# Patient Record
Sex: MALE | Race: WHITE
[De-identification: names, ages, dates, MRNs, and addresses within clinical notes are randomized per-mention and may not be internally consistent; named-entity substitution may affect disease eponyms.]

---

## 2020-07-29 ENCOUNTER — HOSPITAL ENCOUNTER (INPATIENT)
Dept: HOSPITAL 35 - ER | Age: 55
LOS: 2 days | Discharge: HOME | DRG: 62 | End: 2020-07-31
Attending: HOSPITALIST | Admitting: HOSPITALIST
Payer: COMMERCIAL

## 2020-07-29 VITALS — WEIGHT: 174.8 LBS | BODY MASS INDEX: 23.68 KG/M2 | HEIGHT: 72.01 IN

## 2020-07-29 VITALS — SYSTOLIC BLOOD PRESSURE: 160 MMHG | DIASTOLIC BLOOD PRESSURE: 89 MMHG

## 2020-07-29 DIAGNOSIS — Z79.899: ICD-10-CM

## 2020-07-29 DIAGNOSIS — R47.01: ICD-10-CM

## 2020-07-29 DIAGNOSIS — G83.21: ICD-10-CM

## 2020-07-29 DIAGNOSIS — I63.9: Primary | ICD-10-CM

## 2020-07-29 DIAGNOSIS — Z79.82: ICD-10-CM

## 2020-07-29 DIAGNOSIS — Q21.1: ICD-10-CM

## 2020-07-29 LAB
ALBUMIN SERPL-MCNC: 4.6 G/DL (ref 3.4–5)
ALT SERPL-CCNC: 35 U/L (ref 30–65)
ANION GAP SERPL CALC-SCNC: 12 MMOL/L (ref 7–16)
APTT BLD: 27.3 SECONDS (ref 24.5–32.8)
AST SERPL-CCNC: 24 U/L (ref 15–37)
BILIRUB DIRECT SERPL-MCNC: 0.1 MG/DL
BILIRUB SERPL-MCNC: 0.7 MG/DL (ref 0.2–1)
BILIRUB UR-MCNC: NEGATIVE MG/DL
BUN SERPL-MCNC: 16 MG/DL (ref 7–18)
CALCIUM SERPL-MCNC: 9.7 MG/DL (ref 8.5–10.1)
CHLORIDE SERPL-SCNC: 99 MMOL/L (ref 98–107)
CO2 SERPL-SCNC: 27 MMOL/L (ref 21–32)
COLOR UR: YELLOW
CREAT SERPL-MCNC: 0.9 MG/DL (ref 0.7–1.3)
ERYTHROCYTE [DISTWIDTH] IN BLOOD BY AUTOMATED COUNT: 13.2 % (ref 10.5–14.5)
GLUCOSE SERPL-MCNC: 115 MG/DL (ref 74–106)
HCT VFR BLD CALC: 47.4 % (ref 42–52)
HGB BLD-MCNC: 17.1 GM/DL (ref 14–18)
INR PPP: 1
KETONES UR STRIP-MCNC: NEGATIVE MG/DL
MCH RBC QN AUTO: 32 PG (ref 26–34)
MCHC RBC AUTO-ENTMCNC: 36.1 G/DL (ref 28–37)
MCV RBC: 88.5 FL (ref 80–100)
PLATELET # BLD: 284 THOU/UL (ref 150–400)
POTASSIUM SERPL-SCNC: 3.6 MMOL/L (ref 3.5–5.1)
PROT SERPL-MCNC: 7.8 G/DL (ref 6.4–8.2)
PROTHROMBIN TIME: 10.3 SECONDS (ref 9.3–11.4)
RBC # BLD AUTO: 5.35 MIL/UL (ref 4.5–6)
RBC # UR STRIP: NEGATIVE /UL
SODIUM SERPL-SCNC: 138 MMOL/L (ref 136–145)
SP GR UR STRIP: >= 1.03 (ref 1–1.03)
TROPONIN I SERPL-MCNC: <0.06 NG/ML (ref ?–0.06)
URINE CLARITY: CLEAR
URINE GLUCOSE-RANDOM*: NEGATIVE
URINE LEUKOCYTES-REFLEX: NEGATIVE
URINE NITRITE-REFLEX: NEGATIVE
URINE PROTEIN (DIPSTICK): NEGATIVE
UROBILINOGEN UR STRIP-ACNC: 0.2 E.U./DL (ref 0.2–1)
WBC # BLD AUTO: 8.3 THOU/UL (ref 4–11)

## 2020-07-29 PROCEDURE — 10078: CPT

## 2020-07-30 VITALS — DIASTOLIC BLOOD PRESSURE: 88 MMHG | SYSTOLIC BLOOD PRESSURE: 134 MMHG

## 2020-07-30 VITALS — SYSTOLIC BLOOD PRESSURE: 128 MMHG | DIASTOLIC BLOOD PRESSURE: 76 MMHG

## 2020-07-30 VITALS — DIASTOLIC BLOOD PRESSURE: 79 MMHG | SYSTOLIC BLOOD PRESSURE: 123 MMHG

## 2020-07-30 VITALS — SYSTOLIC BLOOD PRESSURE: 130 MMHG | DIASTOLIC BLOOD PRESSURE: 82 MMHG

## 2020-07-30 VITALS — SYSTOLIC BLOOD PRESSURE: 133 MMHG | DIASTOLIC BLOOD PRESSURE: 87 MMHG

## 2020-07-30 VITALS — SYSTOLIC BLOOD PRESSURE: 145 MMHG | DIASTOLIC BLOOD PRESSURE: 96 MMHG

## 2020-07-30 VITALS — SYSTOLIC BLOOD PRESSURE: 118 MMHG | DIASTOLIC BLOOD PRESSURE: 82 MMHG

## 2020-07-30 VITALS — SYSTOLIC BLOOD PRESSURE: 141 MMHG | DIASTOLIC BLOOD PRESSURE: 88 MMHG

## 2020-07-30 VITALS — DIASTOLIC BLOOD PRESSURE: 74 MMHG | SYSTOLIC BLOOD PRESSURE: 118 MMHG

## 2020-07-30 VITALS — SYSTOLIC BLOOD PRESSURE: 132 MMHG | DIASTOLIC BLOOD PRESSURE: 80 MMHG

## 2020-07-30 VITALS — DIASTOLIC BLOOD PRESSURE: 80 MMHG | SYSTOLIC BLOOD PRESSURE: 139 MMHG

## 2020-07-30 VITALS — DIASTOLIC BLOOD PRESSURE: 89 MMHG | SYSTOLIC BLOOD PRESSURE: 131 MMHG

## 2020-07-30 VITALS — SYSTOLIC BLOOD PRESSURE: 131 MMHG | DIASTOLIC BLOOD PRESSURE: 84 MMHG

## 2020-07-30 VITALS — SYSTOLIC BLOOD PRESSURE: 115 MMHG | DIASTOLIC BLOOD PRESSURE: 70 MMHG

## 2020-07-30 VITALS — DIASTOLIC BLOOD PRESSURE: 75 MMHG | SYSTOLIC BLOOD PRESSURE: 122 MMHG

## 2020-07-30 VITALS — SYSTOLIC BLOOD PRESSURE: 125 MMHG | DIASTOLIC BLOOD PRESSURE: 81 MMHG

## 2020-07-30 VITALS — SYSTOLIC BLOOD PRESSURE: 128 MMHG | DIASTOLIC BLOOD PRESSURE: 79 MMHG

## 2020-07-30 VITALS — DIASTOLIC BLOOD PRESSURE: 87 MMHG | SYSTOLIC BLOOD PRESSURE: 141 MMHG

## 2020-07-30 VITALS — SYSTOLIC BLOOD PRESSURE: 124 MMHG | DIASTOLIC BLOOD PRESSURE: 82 MMHG

## 2020-07-30 VITALS — DIASTOLIC BLOOD PRESSURE: 80 MMHG | SYSTOLIC BLOOD PRESSURE: 115 MMHG

## 2020-07-30 VITALS — DIASTOLIC BLOOD PRESSURE: 87 MMHG | SYSTOLIC BLOOD PRESSURE: 128 MMHG

## 2020-07-30 VITALS — SYSTOLIC BLOOD PRESSURE: 144 MMHG | DIASTOLIC BLOOD PRESSURE: 86 MMHG

## 2020-07-30 VITALS — SYSTOLIC BLOOD PRESSURE: 130 MMHG | DIASTOLIC BLOOD PRESSURE: 75 MMHG

## 2020-07-30 VITALS — SYSTOLIC BLOOD PRESSURE: 133 MMHG | DIASTOLIC BLOOD PRESSURE: 92 MMHG

## 2020-07-30 VITALS — SYSTOLIC BLOOD PRESSURE: 127 MMHG | DIASTOLIC BLOOD PRESSURE: 75 MMHG

## 2020-07-30 VITALS — DIASTOLIC BLOOD PRESSURE: 79 MMHG | SYSTOLIC BLOOD PRESSURE: 117 MMHG

## 2020-07-30 VITALS — SYSTOLIC BLOOD PRESSURE: 121 MMHG | DIASTOLIC BLOOD PRESSURE: 72 MMHG

## 2020-07-30 VITALS — DIASTOLIC BLOOD PRESSURE: 77 MMHG | SYSTOLIC BLOOD PRESSURE: 119 MMHG

## 2020-07-30 VITALS — SYSTOLIC BLOOD PRESSURE: 123 MMHG | DIASTOLIC BLOOD PRESSURE: 77 MMHG

## 2020-07-30 VITALS — DIASTOLIC BLOOD PRESSURE: 71 MMHG | SYSTOLIC BLOOD PRESSURE: 119 MMHG

## 2020-07-30 VITALS — DIASTOLIC BLOOD PRESSURE: 87 MMHG | SYSTOLIC BLOOD PRESSURE: 125 MMHG

## 2020-07-30 VITALS — SYSTOLIC BLOOD PRESSURE: 137 MMHG | DIASTOLIC BLOOD PRESSURE: 85 MMHG

## 2020-07-30 VITALS — DIASTOLIC BLOOD PRESSURE: 82 MMHG | SYSTOLIC BLOOD PRESSURE: 128 MMHG

## 2020-07-30 VITALS — SYSTOLIC BLOOD PRESSURE: 134 MMHG | DIASTOLIC BLOOD PRESSURE: 89 MMHG

## 2020-07-30 VITALS — DIASTOLIC BLOOD PRESSURE: 85 MMHG | SYSTOLIC BLOOD PRESSURE: 131 MMHG

## 2020-07-30 VITALS — SYSTOLIC BLOOD PRESSURE: 129 MMHG | DIASTOLIC BLOOD PRESSURE: 84 MMHG

## 2020-07-30 VITALS — DIASTOLIC BLOOD PRESSURE: 76 MMHG | SYSTOLIC BLOOD PRESSURE: 125 MMHG

## 2020-07-30 VITALS — SYSTOLIC BLOOD PRESSURE: 107 MMHG | DIASTOLIC BLOOD PRESSURE: 63 MMHG

## 2020-07-30 VITALS — DIASTOLIC BLOOD PRESSURE: 72 MMHG | SYSTOLIC BLOOD PRESSURE: 115 MMHG

## 2020-07-30 VITALS — DIASTOLIC BLOOD PRESSURE: 69 MMHG | SYSTOLIC BLOOD PRESSURE: 115 MMHG

## 2020-07-30 VITALS — DIASTOLIC BLOOD PRESSURE: 74 MMHG | SYSTOLIC BLOOD PRESSURE: 129 MMHG

## 2020-07-30 LAB
ALBUMIN SERPL-MCNC: 3.5 G/DL (ref 3.4–5)
ALT SERPL-CCNC: 26 U/L (ref 30–65)
ANION GAP SERPL CALC-SCNC: 11 MMOL/L (ref 7–16)
AST SERPL-CCNC: 21 U/L (ref 15–37)
BILIRUB SERPL-MCNC: 0.7 MG/DL (ref 0.2–1)
BUN SERPL-MCNC: 12 MG/DL (ref 7–18)
CALCIUM SERPL-MCNC: 8.1 MG/DL (ref 8.5–10.1)
CHLORIDE SERPL-SCNC: 104 MMOL/L (ref 98–107)
CHOLEST SERPL-MCNC: 209 MG/DL (ref ?–200)
CO2 SERPL-SCNC: 23 MMOL/L (ref 21–32)
CREAT SERPL-MCNC: 0.7 MG/DL (ref 0.7–1.3)
GLUCOSE SERPL-MCNC: 88 MG/DL (ref 74–106)
HDLC SERPL-MCNC: 44 MG/DL (ref 40–?)
LDLC SERPL-MCNC: 119 MG/DL (ref ?–100)
POTASSIUM SERPL-SCNC: 3.6 MMOL/L (ref 3.5–5.1)
PROT SERPL-MCNC: 6.5 G/DL (ref 6.4–8.2)
SODIUM SERPL-SCNC: 138 MMOL/L (ref 136–145)
TC:HDL: 4.8 RATIO
TRIGL SERPL-MCNC: 231 MG/DL (ref ?–150)
VLDLC SERPL CALC-MCNC: 46 MG/DL (ref ?–40)

## 2020-07-30 NOTE — 2DMMODE
[facility]
[address]
[place]   [address]                   2 D/M-MODE ECHOCARDIOGRAM     
_______________________________________________________________________________
 
Name:       MARY VALLEJO                   Room #:         243-P       ADM IN  
M.R.#:      [id number]                       Account #:      [id number]  
Admission:  20    Attend Phys:    Greg Wilkinson MD     
Discharge:              Date of Birth:  [birth year]  
                                                          Report #: [id number]
                                                                    [id number]
_______________________________________________________________________________
THIS REPORT FOR:  
 
cc:  FAM - Family physician unknown
     FAM - Family physician unknown
     Mao Lu MD                                        
                                                                       ~
 
--------------- APPROVED REPORT --------------
 
 
Study performed:  2020 11:43:43
 
EXAM: Comprehensive 2D, Doppler, and color-flow 
Echocardiogram 
Patient Location: ICU    
Room #:  243     Status:  routine
 
      BSA:         2.01
HR: 62 bpm BP:          115/70 mmHg 
Rhythm: NSR     
 
Other Information 
Study Quality: Adequate
 
Indications
CVA/TIA 
 
Echo Enhancing Agent
Indication: Rule out Shunt
Agent(s) / Amount(s) Used: Agitated Saline 7 cc
 
2D Dimensions
RVDd:  37.07 mm  
IVSd:  8.71 (7-11mm) LVOT Diam:  21.98 (18-24mm) 
LVDd:  45.55 mm  
PWd:  9.04 (7-11mm) Ascending Ao:  32.10 (22-36mm)
LVDs:  30.68 (25-40mm) 
Aortic Root:  32.29 mm IVC:  14.00 mm
 
Volumes
Left Atrial Volume (Systole) 
Single Plane 4CH:  33.62 mL Single Plane 2CH:  24.99 mL
    LA ESV Index:  18.00 mL/m2
 
Aortic Valve
AoV Peak Pako.:  1.08 m/s 
 
 
[facility]
[address]
[place]  [address]
Phone:  [phone]                    2 D/M-MODE ECHOCARDIOGRAM     
_______________________________________________________________________________
 
Name:            MARY VALLEJO                   Room #:        243-P       [facility] [place]
.R.#:           [id number]          Account #:     [id number]  
Admission:       20         Attend Phys:   Greg Wilkinson MD 
Discharge:                  Date of Birth: [birth year]  
                         Report #:      [id number]
        [id number]
_______________________________________________________________________________
AO Peak Gr.:  4.67 mmHg  LVOT Max P.80 mmHg
    LVOT Max V:  0.84 m/s
JOSE Vmax: 2.93 cm2  
 
Mitral Valve
    E/A Ratio:  1.1
    MV Decel. Time:  157.07 ms
MV E Max Pako.:  0.76 m/s 
MV A Pako.:  0.71 m/s  
MV PHT:  45.55 ms  
IVRT:  101.50 ms  
 
Pulmonary Valve
PV Peak Pako.:  0.95 m/s PV Peak Gr.:  3.78 mmHg
 
Pulmonary Vein
P Vein S:    0.44 m/s P Vein A:  0.21 m/s
P Vein D:   0.42 m/s P Vein A Dur.:  96.9 msec
P Vein S/D Ratio:  1.05 
 
Tricuspid Valve
TR Peak Pako.:  2.44 m/s  
TR Peak Gr.:  23.88 mmHg 
    PA Pressure:  29.00 mmHg
 
Left Ventricle
The left ventricle is normal size. There is normal LV segmental wall 
motion. There is normal left ventricular wall thickness. Left 
ventricular systolic function is normal. The left ventricular 
ejection fraction is within the normal range. LVEF is 55-60%. The 
left ventricular diastolic function is normal.
 
Right Ventricle
The right ventricle is normal size. The right ventricular systolic 
function is normal.
 
Atria
The left atrium size is normal. Small PFO is noted is noted with 
color flow doppler. The right atrium size is normal.
 
Aortic Valve
The aortic valve is normal in structure. No aortic regurgitation is 
present. There is no aortic valvular stenosis.
 
Mitral Valve
The mitral valve is normal in structure. Trace mitral regurgitation. 
 
 
[facility]
[address]
[place]  [address]
Phone:  [phone]                    2 D/M-MODE ECHOCARDIOGRAM     
_______________________________________________________________________________
 
Name:            MARY VALLEJO                   Room #:        243-P       [facility] [place]
[facility].#:           [id number]          Account #:     [id number]  
Admission:       20         Attend Phys:   Greg Wilkinson MD 
Discharge:                  Date of Birth: [birth year]  
                         Report #:      [id number]
        [id number]
_______________________________________________________________________________
No evidence of mitral valve stenosis.
 
Tricuspid Valve
The tricuspid valve is normal in structure. There is trace tricuspid 
regurgitation. Estimated PAP 29 mmHg. There is no pulmonary 
hypertension.
 
Pulmonic Valve
The pulmonary valve is normal in structure. There is no pulmonic 
valvular regurgitation.
 
Great Vessels
The aortic root is normal in size. IVC is normal in size and 
collapses >50% with inspiration.
 
Pericardium
There is no pericardial effusion.
 
<Conclusion>
The left ventricle is normal size.
LVEF is 55-60%.
The aortic valve is normal in structure.
The tricuspid valve is normal in structure.
There is trace tricuspid regurgitation. Estimated PAP 29 mmHg.
There is no pulmonary hypertension.
The pulmonary valve is normal in structure.
There is no pericardial effusion.
Small PFO is noted is noted with color flow doppler.
 
 
 
 
 
 
 
 
 
 
 
 
 
 
 
 
  <ELECTRONICALLY SIGNED>
   By: Mao Lu MD    
  20     1322
D: 20                           _____________________________________
T: 20                           Mao Lu MD      /INF

## 2020-07-31 VITALS — DIASTOLIC BLOOD PRESSURE: 76 MMHG | SYSTOLIC BLOOD PRESSURE: 127 MMHG

## 2020-07-31 VITALS — SYSTOLIC BLOOD PRESSURE: 127 MMHG | DIASTOLIC BLOOD PRESSURE: 76 MMHG

## 2020-07-31 VITALS — SYSTOLIC BLOOD PRESSURE: 130 MMHG | DIASTOLIC BLOOD PRESSURE: 75 MMHG

## 2020-07-31 VITALS — DIASTOLIC BLOOD PRESSURE: 83 MMHG | SYSTOLIC BLOOD PRESSURE: 134 MMHG

## 2020-07-31 VITALS — DIASTOLIC BLOOD PRESSURE: 86 MMHG | SYSTOLIC BLOOD PRESSURE: 131 MMHG

## 2020-07-31 LAB
EST. AVERAGE GLUCOSE BLD GHB EST-MCNC: 111 MG/DL
GLYCOHEMOGLOBIN (HGB A1C): 5.5 % (ref 4.8–5.6)
TSH SERPL-ACNC: 2.5 UIU/ML (ref 0.36–3.74)
VIT B12 SERPL-MCNC: 382 PG/ML (ref 193–986)

## 2020-07-31 NOTE — EKG
[facility]
[address]
[place]   [address]                     ELECTROCARDIOGRAM REPORT      
_______________________________________________________________________________
 
Name:       GENEVIEVEMARY TRAN                   Room #:         208-P       ADM IN  
M.R.#:      [id number]                       Account #:      [id number]  
Admission:  20    Attend Phys:    Greg Wilkinson MD     
Discharge:              Date of Birth:  [birth year]  
                                                          Report #: [id number]
                                                                    [id number]
_______________________________________________________________________________
THIS REPORT FOR:  
 
cc:  FAM - Family physician unknown
     FAM - Family physician unknown
     Cj Fried MD [facility]                                        ~
THIS REPORT FOR:   //name//                          
 
                         [facility] ED
                                       
Test Date:    2020               Test Time:    18:08:42
Pat Name:     MARY VALLEJO                Department:   
Patient ID:   [id number]            Room:         208
Gender:       M                        Technician:   ESHEETS
:          1965               Requested By: Omayra Dowell
Order Number: [id number] MD:   Cj Fried
                                 Measurements
Intervals                              Axis          
Rate:         70                       P:            77
VA:           177                      QRS:          54
QRSD:         100                      T:            32
QT:           377                                    
QTc:          407                                    
                           Interpretive Statements
Sinus rhythm
Normal tracing
Baseline wander in lead(s) V2
No previous ECG available for comparison
Electronically Signed On 2020 8:53:41 CDT by Cj Fried
[email or web address]
 
 
 
 
 
 
 
 
 
 
 
 
 
 
 
  <ELECTRONICALLY SIGNED>
   By: Cj Fried MD, [facility]   
  20     0853
D: 20 1808                           _____________________________________
T: 20 180                           Cj Fried MD, [facility]     /EPI

## 2020-08-01 NOTE — HC
[facility]
[address]
[place]   [address]                     CONSULTATION                  
_______________________________________________________________________________
 
Name:       GENEVIEVEMARY CHELSEA                   Room #:         208-P       [facility] [place]  
..#:      [id number]                       Account #:      [id number]  
Admission:  07/29/20    Attend Phys:    Greg Wilkinson MD     
Discharge:  07/31/20    Date of Birth:  [birth year]  
                                                          Report #: [id number]
                                                                    [id number]   
_______________________________________________________________________________
THIS REPORT FOR:  
 
cc:  PIERO - Family physician unknown
     PIERO - Family physician unknown                                       
     Bill Dennis MD                                         ~
CC: PIERO unknown
    Greg Wilkinson
 
DATE OF SERVICE:  07/29/2020
 
 
HISTORY OF PRESENT ILLNESS:  This is a 55-year-old male patient who was
evaluated by me for acute onset of aphasia, which happened around 5:30.  He
noticed that he could not express himself.  Initially, the patient was managed
by Dr. Dowell from Emergency Room and she did a CT scan of the head and blood
workup in this patient and then Neurology consultation was requested.  I
immediately started from home and came and saw the patient.  The patient is
having pretty significant expressive aphasia.  It was sudden in onset.  He never
had a stroke in the past.  He apparently had an optic neuritis, but there was no
hemorrhage in the eye.  They do not know if it was a retinal artery embolus or
anything else, but they are pretty certain there was no hemorrhage in the eye. 
He got only partial vision back in the right eye.  He does have some dizziness,
but the major problem appeared to be aphasia.  He does not have any history of
anxiety.  He does not have any history of depression.  He does not have that
many risk factors for stroke either.  He has no history of migraine.  He has no
recent trauma which can predispose this patient for dissection, but spontaneous
dissection can also occur.  He did take an aspirin.  He is not on any blood
thinner.
 
REVIEW OF SYSTEMS:  Negative for this kind of stroke and negative for any stroke
mimics.  
 
PAST MEDICAL HISTORY:  Negative for any stroke, but is positive for some optic
neuritis.
 
FAMILY HISTORY:  Unremarkable.
 
SOCIAL HISTORY:  He does not smoke.
 
PHYSICAL EXAMINATION:  Indicate that he is alert.  When he tries to talk, he has
to struggle with the word, he will say a few words, but then he will struggle
with the words again.  He becomes frustrated with that.  He is complaining of
some dizziness, but I do not see any problem with extraocular movements, except
his baseline right eye deficit.  His strength may be trace diminished on the
right side, but the difference is minor.  His sensation looks intact.  Cardiac
and respiratory examinations appear unremarkable.
 
 
 
[facility]
[address] [place]   [address]                     CONSULTATION                  
_______________________________________________________________________________
 
Name:       MARY VALLEJO CHELSEA                   Room #:         208-P       [facility] [place]  
..#:      [id number]                       Account #:      [id number]  
Admission:  07/29/20    Attend Phys:    Greg Wilkinson MD     
Discharge:  07/31/20    Date of Birth:  [birth year]  
                                                          Report #: [id number]
                                                                    [id number]   
_______________________________________________________________________________
 
IMPRESSION:  I had a koko discussion with the patient and the wife.  I told
them I cannot be certain what his symptoms are coming from.  I discussed with
him that he does have a stroke-like symptoms, but stroke mimics cannot be
excluded at this time.  If we try to exclude the stroke mimics, the time to give
him TPA will run out, which the patient should receive TPA also has been
questionable and varying a lot.  Previously we never used to give TPA with low
NIH scale.  Then the feeling was that we should give to all of them because if
they become worse, the time has passed.  Now, there are again some studies has
raised the question about that.  One of the things we should consider is the
disability, which this will cause.  He has a profound expressive aphasia and he
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
recover.
 
I discussed all of it with him in detail.  I discussed with him potential
complication of TPA.  I told him I do not know what the diagnosis of the eye
problem was.  It can aggravate the problem and it can also be MS.  Although MS
does not come that suddenly.  If he had some hemorrhage, it can become worse
with the TPA and he can lose vision in the right eye.  I tried to look at the
right eye and I could not tell.  After discussing all of it with the patient and
the wife after discussing all their options with them and after giving them the
option whether they want to receive the TPA or not, they decided they want to
receive the TPA.  They fully understand the catastrophic complication of
intracerebral hemorrhage, which is fatal majority of the time when it is
symptomatic and they want to receive TPA.  TPA was started.  He got the morning
dose without any problem.  As I am dictating, I got a call from the radiologist
whom I have given my number to call after looking at the CT and perfusion and he
tells me that was normal.  I am going to go and talk to him and I might cut back
his dose to 0.6 mg/kg of the body weight after talking to him because that dose
has been found to be as effective as 0.9 or we may continue with the 0.9.  All
of it was discussed with the patient in detail and he understands very well and
he was given TPA and he need to go to ICU and we will watch him very closely.  I
had examined him repeatedly and his aphasia has not become better, but his
weakness is minor.  He signed the consent for TPA and he has also given oral
consent.
 
Thank you very much for this referral.
This addendum is being added at the time of signinig the note. Patient was
given TPA but patient's blood pressure started to drop. He was given fluid
bolus but blood pressure did not come up. After multiple attenpts it was found
that BP machine was not accurate. Bolus was stopped. There was concern about
complication and options were discussed with patient again and he was give 0.6
mg dose of TPA after discusssing his options and pros and cons of option. Total
of more than 90 minutes of time was spent taking care of this patient including
 
 
 
[facility]
[address]
[place]   [address]                     CONSULTATION                  
_______________________________________________________________________________
 
Name:       MARY VALLEJO                   Room #:         208-P       [facility] [place]  
M.R.#:      [id number]                       Account #:      [id number]  
Admission:  07/29/20    Attend Phys:    Greg Wilkinson MD     
Discharge:  07/31/20    Date of Birth:  [birth year]  
                                                          Report #: [id number]
                                                                    [id number]   
_______________________________________________________________________________
about half an hour of critical care time while monitoring and adjusting his
treatment with TPA
 
 
 
 
 
 
 
 
 
 
 
 
 
 
 
 
 
 
 
 
 
 
 
 
 
 
 
 
 
 
 
 
 
 
 
 
 
 
 
 
 
 
  <ELECTRONICALLY SIGNED>
   By: Bill Dennis MD         
  08/01/20 1917
D: 07/29/20 1920                           _____________________________________
T: 07/29/20 2056                           Bill Dennis MD           /nt

## 2021-11-01 ENCOUNTER — HOSPITAL ENCOUNTER (OUTPATIENT)
Dept: HOSPITAL 35 - CATH | Age: 56
Discharge: HOME | End: 2021-11-01
Attending: INTERNAL MEDICINE
Payer: COMMERCIAL

## 2021-11-01 VITALS — DIASTOLIC BLOOD PRESSURE: 88 MMHG | SYSTOLIC BLOOD PRESSURE: 132 MMHG

## 2021-11-01 DIAGNOSIS — Z86.73: ICD-10-CM

## 2021-11-01 DIAGNOSIS — R00.2: ICD-10-CM

## 2021-11-01 DIAGNOSIS — Z79.899: ICD-10-CM

## 2021-11-01 DIAGNOSIS — E78.5: ICD-10-CM

## 2021-11-01 DIAGNOSIS — Z98.890: ICD-10-CM

## 2021-11-01 DIAGNOSIS — Z45.09: Primary | ICD-10-CM

## 2021-11-04 NOTE — CATHLAB
[facility]
[place]   [address]                   INVASIVE PROCEDURE REPORT     
_______________________________________________________________________________
 
Name:       MARY VALLEJO                   Room #:                     [facility].#:      [id number]                       Account #:      [id number]  
Admission:  11/01/21    Attend Phys:    Cj Fried MD,
Discharge:              Date of Birth:  [birth year]  
                                                          Report #: [id number]
                                                                    [id number] 
_______________________________________________________________________________
THIS REPORT FOR:  
 
cc:  Gordon Bartlett MD, Neal A. MD Lundgren,Cj BO MD [facility]                                        
                                                                       ~
DATE OF SERVICE: 11/01/2021
 
IMPLANTABLE LOOP RECORDER
 
REASON FOR THE STUDY:  Prior strokes
 
PROCEDURE:  The potential benefits and risks of the procedure were discussed at 
length with the patient who understood; full written and informed consent was 
obtained.  The patient's left anterior chest was prepped and draped in a sterile
fashion.  A 1% Xylocaine was used as local anesthetic; a small less than 1-cm 
incision was made over the fourth intercostal space.  A Medtronic LINQ loop 
recorder, serial number [id number] was placed.  A single bioabsorbable stitch 
was placed, and the incision was covered with a sterile Band-Aid.  He tolerated 
the procedure well.  Sensing thresholds were excellent.
 
SUMMARY:  Successful Medtronic Reveal LINQ loop recorder and evaluation of 
recurrent stroke.
 
 
 
 
 
 
 
 
 
 
 
 
 
 
 
 
 
 
 
 
 
  <ELECTRONICALLY SIGNED>
   By: Cj Fried MD, [facility]   
  11/04/21     1312
D: 11/01/21 0703                           _____________________________________
T: 11/01/21 0922                           Cj Fried MD, FACC     /nt

## 2021-12-03 ENCOUNTER — HOSPITAL ENCOUNTER (EMERGENCY)
Dept: HOSPITAL 35 - ER | Age: 56
Discharge: HOME | End: 2021-12-03
Payer: COMMERCIAL

## 2021-12-03 VITALS — HEIGHT: 72 IN | WEIGHT: 185.01 LBS | BODY MASS INDEX: 25.06 KG/M2

## 2021-12-03 VITALS — SYSTOLIC BLOOD PRESSURE: 143 MMHG | DIASTOLIC BLOOD PRESSURE: 80 MMHG

## 2021-12-03 DIAGNOSIS — Z88.8: ICD-10-CM

## 2021-12-03 DIAGNOSIS — E78.5: ICD-10-CM

## 2021-12-03 DIAGNOSIS — Z86.73: ICD-10-CM

## 2021-12-03 DIAGNOSIS — R68.83: ICD-10-CM

## 2021-12-03 DIAGNOSIS — Z79.82: ICD-10-CM

## 2021-12-03 DIAGNOSIS — Z20.822: ICD-10-CM

## 2021-12-03 DIAGNOSIS — R42: Primary | ICD-10-CM

## 2021-12-03 LAB
ALBUMIN SERPL-MCNC: 4.1 G/DL (ref 3.4–5)
ALT SERPL-CCNC: 54 U/L (ref 30–65)
ANION GAP SERPL CALC-SCNC: 12 MMOL/L (ref 7–16)
AST SERPL-CCNC: 29 U/L (ref 15–37)
BASOPHILS NFR BLD AUTO: 0.5 % (ref 0–2)
BILIRUB SERPL-MCNC: 0.9 MG/DL (ref 0.2–1)
BUN SERPL-MCNC: 9 MG/DL (ref 7–18)
CALCIUM SERPL-MCNC: 9.1 MG/DL (ref 8.5–10.1)
CHLORIDE SERPL-SCNC: 102 MMOL/L (ref 98–107)
CO2 SERPL-SCNC: 27 MMOL/L (ref 21–32)
CREAT SERPL-MCNC: 1 MG/DL (ref 0.7–1.3)
EOSINOPHIL NFR BLD: 0.8 % (ref 0–3)
ERYTHROCYTE [DISTWIDTH] IN BLOOD BY AUTOMATED COUNT: 13.4 % (ref 10.5–14.5)
GLUCOSE SERPL-MCNC: 107 MG/DL (ref 74–106)
GRANULOCYTES NFR BLD MANUAL: 77.1 % (ref 36–66)
HCT VFR BLD CALC: 50.1 % (ref 42–52)
HGB BLD-MCNC: 17 GM/DL (ref 14–18)
LYMPHOCYTES NFR BLD AUTO: 12.7 % (ref 24–44)
MCH RBC QN AUTO: 30.6 PG (ref 26–34)
MCHC RBC AUTO-ENTMCNC: 34 G/DL (ref 28–37)
MCV RBC: 90.2 FL (ref 80–100)
MONOCYTES NFR BLD: 8.9 % (ref 1–8)
NEUTROPHILS # BLD: 8.4 THOU/UL (ref 1.4–8.2)
PLATELET # BLD: 270 THOU/UL (ref 150–400)
POTASSIUM SERPL-SCNC: 3.7 MMOL/L (ref 3.5–5.1)
PROT SERPL-MCNC: 7.4 G/DL (ref 6.4–8.2)
RBC # BLD AUTO: 5.56 MIL/UL (ref 4.5–6)
SODIUM SERPL-SCNC: 141 MMOL/L (ref 136–145)
WBC # BLD AUTO: 10.9 THOU/UL (ref 4–11)

## 2021-12-03 NOTE — EKG
[place] [address]
[place]  [address]
Phone:  [phone]                    ELECTROCARDIOGRAM REPORT      
_______________________________________________________________________________
 
Name:       MARY VALLEJO CHELSEA                   Room #:                     [facility]  
TERRANCE#:      [id number]     Account #:      [id number]  
Admission:  21    Attend Phys:                          
Discharge:  21    Date of Birth:  [birth year]  
                                                          Report #: [id number]
   [id number]
_______________________________________________________________________________
                         [facility] ED
                                       
Test Date:    2021               Test Time:    01:16:43
Pat Name:     MARY VALLEJO                Department:   
Patient ID:   [id number]            Room:          
Gender:       M                        Technician:   
:          1965               Requested By: Ricky Keys
Order Number: [id number] MD:   Cj Fried
                                 Measurements
Intervals                              Axis          
Rate:         70                       P:            77
MD:           177                      QRS:          63
QRSD:         93                       T:            34
QT:           388                                    
QTc:          419                                    
                           Interpretive Statements
Sinus rhythm
No significant abnormality
Compared to ECG 2020 18:08:42
No significant changes
Electronically Signed On 12-3-2021 7:51:18 CST by Cj Fried
[email or web address]
 
 
 
 
 
 
 
 
 
 
 
 
 
 
 
 
 
 
 
 
 
  <ELECTRONICALLY SIGNED>
   By: Cj Fried MD, [facility]   
  21     0751
D: 21 0116                           _____________________________________
T: 21 0116                           Cj Fried MD, FACC     /EPI

## 2022-02-02 ENCOUNTER — HOSPITAL ENCOUNTER (OUTPATIENT)
Dept: HOSPITAL 35 - ER | Age: 57
Setting detail: OBSERVATION
LOS: 1 days | Discharge: HOME | End: 2022-02-03
Attending: HOSPITALIST | Admitting: HOSPITALIST
Payer: COMMERCIAL

## 2022-02-02 VITALS — DIASTOLIC BLOOD PRESSURE: 72 MMHG | SYSTOLIC BLOOD PRESSURE: 113 MMHG

## 2022-02-02 VITALS — SYSTOLIC BLOOD PRESSURE: 141 MMHG | DIASTOLIC BLOOD PRESSURE: 85 MMHG

## 2022-02-02 VITALS — SYSTOLIC BLOOD PRESSURE: 129 MMHG | DIASTOLIC BLOOD PRESSURE: 80 MMHG

## 2022-02-02 VITALS — BODY MASS INDEX: 26.01 KG/M2 | HEIGHT: 72.01 IN | WEIGHT: 192 LBS

## 2022-02-02 VITALS — SYSTOLIC BLOOD PRESSURE: 113 MMHG | DIASTOLIC BLOOD PRESSURE: 72 MMHG

## 2022-02-02 DIAGNOSIS — R42: Primary | ICD-10-CM

## 2022-02-02 DIAGNOSIS — Z79.82: ICD-10-CM

## 2022-02-02 DIAGNOSIS — Z79.899: ICD-10-CM

## 2022-02-02 DIAGNOSIS — Z20.822: ICD-10-CM

## 2022-02-02 DIAGNOSIS — F41.9: ICD-10-CM

## 2022-02-02 DIAGNOSIS — Q21.1: ICD-10-CM

## 2022-02-02 DIAGNOSIS — E78.5: ICD-10-CM

## 2022-02-02 DIAGNOSIS — I10: ICD-10-CM

## 2022-02-02 DIAGNOSIS — I63.9: ICD-10-CM

## 2022-02-02 LAB
ALBUMIN SERPL-MCNC: 3.9 G/DL (ref 3.4–5)
ALT SERPL-CCNC: 66 U/L (ref 16–63)
ANION GAP SERPL CALC-SCNC: 8 MMOL/L (ref 7–16)
AST SERPL-CCNC: 27 U/L (ref 15–37)
BASOPHILS NFR BLD AUTO: 0.4 % (ref 0–2)
BILIRUB SERPL-MCNC: 1.1 MG/DL (ref 0.2–1)
BUN SERPL-MCNC: 14 MG/DL (ref 7–18)
CALCIUM SERPL-MCNC: 9.4 MG/DL (ref 8.5–10.1)
CHLORIDE SERPL-SCNC: 105 MMOL/L (ref 98–107)
CO2 SERPL-SCNC: 29 MMOL/L (ref 21–32)
CREAT SERPL-MCNC: 1 MG/DL (ref 0.7–1.3)
EOSINOPHIL NFR BLD: 0.6 % (ref 0–3)
ERYTHROCYTE [DISTWIDTH] IN BLOOD BY AUTOMATED COUNT: 13.3 % (ref 10.5–14.5)
GLUCOSE SERPL-MCNC: 111 MG/DL (ref 74–106)
GRANULOCYTES NFR BLD MANUAL: 82.2 % (ref 36–66)
HCT VFR BLD CALC: 47.1 % (ref 42–52)
HGB BLD-MCNC: 16.4 GM/DL (ref 14–18)
LYMPHOCYTES NFR BLD AUTO: 11.7 % (ref 24–44)
MCH RBC QN AUTO: 30.4 PG (ref 26–34)
MCHC RBC AUTO-ENTMCNC: 34.7 G/DL (ref 28–37)
MCV RBC: 87.6 FL (ref 80–100)
MONOCYTES NFR BLD: 5.1 % (ref 1–8)
NEUTROPHILS # BLD: 9.8 THOU/UL (ref 1.4–8.2)
PLATELET # BLD: 281 THOU/UL (ref 150–400)
POTASSIUM SERPL-SCNC: 4.3 MMOL/L (ref 3.5–5.1)
PROT SERPL-MCNC: 6.8 G/DL (ref 6.4–8.2)
RBC # BLD AUTO: 5.38 MIL/UL (ref 4.5–6)
SODIUM SERPL-SCNC: 142 MMOL/L (ref 136–145)
WBC # BLD AUTO: 11.9 THOU/UL (ref 4–11)

## 2022-02-02 NOTE — [place] [address]                     EMS Patient Care Report       
_______________________________________________________________________________
 
Name:       MARY VALLEJO                   Room #:                     REG [facility]#:      [id number]                       Account #:      [id number]  
Admission:  22    Attend Phys:                          
Discharge:              Date of Birth:  [birth year]  
                                                          Report #: [id number]
                                                                    [id number]
_______________________________________________________________________________
THIS REPORT FOR:   //name//                          
 
Report Transmitted: 2022 17:34
EMS Care Summary
[place], [place]/[facility]
Incident [id number] @ 2022 16:46
 
Incident Location
[address] [place] [address]
 
Patient
MARY VALLEJO
Male, 56 Years
 1965
 
Patient Address
[address] [place] [address]
 
Patient History
Hypertension (HTN),Stroke/CVA,Hyperlipidemia,
 
Patient Allergies
Other drug allergy,
 
Patient Medications
Vitamin D, Aspirin, Zetia, Nebivolol,
 
Chief Complaint
right sided tingling
 
Disposition
Transported No Lights/[facility]
 
Dispatch Reason
Stroke/CVA
 
Transported To
[facility]
 
Narrative
M36 dispatched on a stroke. M36 arrived to home to find PT seated in front 
room. PT stated having a stroke as chief complaint. PT stated he felt like this 
the last time he had a stroke. PT stated symptoms started 30 minutes ago when 
he had trouble spelling words in a letter he was writing. PT denied falls but 
 
 
 
[place] [address]                     EMS Patient Care Report       
_______________________________________________________________________________
 
Name:       MARY VALLEJO                   [facility]                     [id number]                       Account #:      [id number]  
Admission:  22    Attend Phys:                          
Discharge:              Date of Birth:  [birth year]  
                                                          Report #: [id number]
                                                                    [id number]
_______________________________________________________________________________
stated he did feel weak and tingly on his right side. PT stated "a year and a 
half ago I had a stroke and was given the clot buster drugs." PT denied being 
on blood thinners. PT [place] stroke scale normal on scene. PT assisted to 
stand and take steps to stretcher. PT sat on stretcher and was secured with 
seatbelts. 
 
PT vitals monitored during transport. PT report given. PT scooted self to 
hospital bed. PT care and belongings transferred to ER staff at [facility] without incident. M36 placed back in service. 
 
Initial Vitals
@16:50P: 66,R: 20,BP: 151/84,Pain: 0/10,GCS: 15,CO: 0,SpO2: 100,Revised Trauma: 
12, 
@17:00P: 61,R: 20,BP: 148/90,Pain: 0/10,GCS: 15,SpO2: 99,Revised Trauma: 12,
@16:59P: 62,R: 18,Pain: 0/10,GCS: 15,CO: 0,SpO2: 99,MI Suspected: false
@16:55P: 65,R: 18,BP: 149/86,Pain: 0/10,GCS: 15,CO: 0,SpO2: 100,Revised Trauma: 
12, 
 
Assessments
@16:51MENTAL:Person Oriented,Place Oriented,Time Oriented,Event 
Oriented,SKIN:Pale,HEENT:Eyes: Right: Other,LUNG 
SOUNDS:ABDOMEN:PELVIS//GI:EXTREMITIES:Right Arm: Abnormal Sensation,Right 
Arm: Weakness,Right Leg: Weakness,Right Leg: Abnormal Sensation,PULSE:Radial: 
2+ Normal,NEURO:Weakness Right-Sided, 
 
Impression
Stroke
 
Procedures
@16:59 12-Lead ECG Response: UnchangedSucceeded
@16:50 ALS Assessment Response: UnchangedSucceeded
@16:56 IV Therapy - Saline Lock 10cc (18 ga) Site: Forearm-Left Response: 
UnchangedSucceeded 
@16:52 3-Lead ECG Response: UnchangedSucceeded
 
 
Timeline
16:44,Call Received
16:44,Dispatch Notified
16:46,Dispatched
16:46,En Route
16:49,On Scene
16:50,At Patient
16:50,BP: 151/84 M,PULSE: 66,RR: 20 R,SPO2: 100 Ox,ETCO2:  ,BG: ,PAIN: 0,GCS: 
15, 
16:50,ALS Assessment,Response: UnchangedSucceeded,
 
 
 
[address]   [address]                     EMS Patient Care Report       
_______________________________________________________________________________
 
Name:       MARY VALLEJO                   Room #:                     REG [facility]#:      [id number]                       Account #:      [id number]  
Admission:  22    Attend Phys:                          
Discharge:              Date of Birth:  [birth year]  
                                                          Report #: [id number]
                                                                    [id number]
_______________________________________________________________________________
16:52,3-Lead ECG,Response: UnchangedSucceeded,
16:55,BP: 149/86 M,PULSE: 65,RR: 18 R,SPO2: 100 Ox,ETCO2:  ,BG: ,PAIN: 0,GCS: 
15, 
16:56,IV Therapy - Saline Lock 10cc 18 ga Site: Forearm-Left,Response: 
UnchangedSucceeded, 
16:59,12-Lead ECG,Response: UnchangedSucceeded,
16:59,BP: / M,PULSE: 62,RR: 18 R,SPO2: 99 Ox,ETCO2:  ,BG: ,PAIN: 0,GCS: 15,
17:00,BP: 148/90 M,PULSE: 61,RR: 20 R,SPO2: 99 Ox,ETCO2:  ,BG: ,PAIN: 0,GCS: 15,
17:02,Depart Scene
17:18,At Destination
17:26,Call Closed
 
Disclaimer
v1.1     Copyright  [employer]
This EMS Care Summary contains data elements from the applicable legal record 
(which may be displayed differently). It is designed to provide pertinent 
information for the following purposes: continuity of care, clinical quality, 
and state data reporting. The complete legal record is available to ED staff 
and administrators of the receiving hospital in [employer]'s Patient Tracker. All data 
is provided "as is."

## 2022-02-03 VITALS — SYSTOLIC BLOOD PRESSURE: 120 MMHG | DIASTOLIC BLOOD PRESSURE: 71 MMHG

## 2022-02-03 VITALS — DIASTOLIC BLOOD PRESSURE: 73 MMHG | SYSTOLIC BLOOD PRESSURE: 117 MMHG

## 2022-02-03 VITALS — SYSTOLIC BLOOD PRESSURE: 112 MMHG | DIASTOLIC BLOOD PRESSURE: 76 MMHG

## 2022-02-03 VITALS — SYSTOLIC BLOOD PRESSURE: 149 MMHG | DIASTOLIC BLOOD PRESSURE: 89 MMHG

## 2022-02-03 VITALS — DIASTOLIC BLOOD PRESSURE: 75 MMHG | SYSTOLIC BLOOD PRESSURE: 144 MMHG

## 2022-02-03 LAB
CHOLEST SERPL-MCNC: 87 MG/DL (ref ?–200)
HDLC SERPL-MCNC: 48 MG/DL (ref 40–?)
LDLC SERPL-MCNC: 12 MG/DL (ref ?–100)
TC:HDL: 1.8 RATIO
TRIGL SERPL-MCNC: 136 MG/DL (ref ?–150)
VLDLC SERPL CALC-MCNC: 27 MG/DL (ref ?–40)

## 2022-02-03 NOTE — EKG
[address]  [address]
Phone:  [phone]                    ELECTROCARDIOGRAM REPORT      
_______________________________________________________________________________
 
Name:       MARY VALLEJO                   Room #:         439-P       [facility]#:      [id number]     Account #:      [id number]  
Admission:  22    Attend Phys:    David Paris MD 
Discharge:              Date of Birth:  [birth year]  
                                                          Report #: [id number]
   [id number]
_______________________________________________________________________________
                         [facility] ED
                                       
Test Date:    2022               Test Time:    17:28:59
Pat Name:     MARY VALLEJO                Department:   
Patient ID:   [id number]            Room:         439
Gender:       M                        Technician:   
:          1965               Requested By: Lola Quintero
Order Number: [id number] MD:   Bernard Reyes
                                 Measurements
Intervals                              Axis          
Rate:         57                       P:            45
UT:           163                      QRS:          55
QRSD:         82                       T:            31
QT:           397                                    
QTc:          387                                    
                           Interpretive Statements
Sinus rhythm
Compared to ECG 2021 01:16:43
No significant changes
Electronically Signed On 2-3-2022 7:59:42 CST by Bernard Reyes
[email or web address]
 
 
 
 
 
 
 
 
 
 
 
 
 
 
 
 
 
 
 
 
 
 
  <ELECTRONICALLY SIGNED>
   By: Bernard Reyes MD, [facility]    
  22     [id number]
D: 22 1728                           _____________________________________
T: 22 1728                           Bernard Reyes MD, FACC      /EPI

## 2022-02-03 NOTE — 2DMMODE
[facility]
[address]
[place]   [address]                   2 D/M-MODE ECHOCARDIOGRAM     
_______________________________________________________________________________
 
Name:       MARY VALLEJO                   Room #:         439-P       ADM Omi 
M.R.#:      [id number]                       Account #:      [id number]  
Admission:  02/02/22    Attend Phys:    David Paris MD 
Discharge:              Date of Birth:  [birth year]  
                                                          Report #: [id number]
                                                                    [id number]
_______________________________________________________________________________
THIS REPORT FOR:  
 
cc:  Gordon Bartlett MD, Neal A. MD Lammoglia, Francisco J. MD                                        
                                                                       ~
 
--------------- APPROVED REPORT --------------
 
 
Study performed:  02/03/2022 11:21:39
 
EXAM: Comprehensive 2D, Doppler, and color-flow 
Echocardiogram 
Patient Location: In-Patient   
Room #:  439     Status:  routine
 
      BSA:         2.11
HR: 47 bpm BP:          120/71 mmHg 
Rhythm: NSR     
 
Other Information 
Study Quality: Adequate
 
Indications
CVA/TIA 
 
2D Dimensions
IVSd:  7.68 (7-11mm)  LVOT Diam:  22.19 (18-24mm) 
LVDd:  43.71 mm   
PWd:  11.21 (7-11mm)  Ascending Ao:  35.30 (22-36mm)
LVDs:  31.79 (25-40mm)  
Left Atrium:  30.70 (27-40mm) 
Aortic Root:  31.94 mm  
 
Tricuspid Valve
TR Peak Pako.:  2.38 m/s  RAP Estimate:  7.00 mmHg
TR Peak Gr.:  22.75 mmHg RVSP:  29.00 mmHg
 
Left Ventricle
The left ventricle is normal size. There is normal LV segmental wall 
motion. There is normal left ventricular wall thickness. Left 
ventricular systolic function is normal. The left ventricular 
ejection fraction is within the normal range. LVEF is 50-55%. This 
study is not technically sufficient to allow evaluation of the LV 
diastolic function.
 
 
[facility]
[address]
[place]  [address]
Phone:  [phone]                    2 D/M-MODE ECHOCARDIOGRAM     
_______________________________________________________________________________
 
Name:            MARY VALLEJO                   Room #:        439-P       [facility] [place]
..#:           [id number]          Account #:     [id number]  
Admission:       02/02/22         Attend Phys:   David Paris,
Discharge:                  Date of Birth: [birth year]  
                         Report #:      [id number]
        [id number]
_______________________________________________________________________________
 
Right Ventricle
The right ventricle is normal size. The right ventricular systolic 
function is normal.
 
Atria
The left atrium size is normal. Possible PFO is noted. The right 
atrium size is normal.
 
Aortic Valve
The aortic valve is normal in structure. No aortic regurgitation is 
present.
 
Mitral Valve
The mitral valve is normal in structure. Mild mitral regurgitation. 
No evidence of mitral valve stenosis.
 
Tricuspid Valve
The tricuspid valve is normal in structure. Mild tricuspid 
regurgitation PAP 28 mmHg
 
Pulmonic Valve
The pulmonary valve is normal in structure.
 
Great Vessels
The aortic root is normal in size. IVC is normal in size and 
collapses >50% with inspiration.
 
Pericardium
There is no pericardial effusion.
 
<Conclusion>
The left ventricle is normal size.
LVEF is 50-55%.
The right ventricle is normal size.
The left atrium size is normal.
The aortic valve is normal in structure.
The mitral valve is normal in structure.
Mild mitral regurgitation.
The tricuspid valve is normal in structure.
Mild tricuspid regurgitation
PAP 28 mmHg
The pulmonary valve is normal in structure.
The aortic root is normal in size.
There is no pericardial effusion.
Possible PFO is noted on color flow although technically very 
 
 
[facility]
[address]
[place]  [address]
Phone:  [phone]                    2 D/M-MODE ECHOCARDIOGRAM     
_______________________________________________________________________________
 
Name:            MARY VALLEJO                   Room #:        439-P       [facility] [place]
.R.#:           [id number]          Account #:     [id number]  
Admission:       02/02/22         Attend Phys:   David Paris,
Discharge:                  Date of Birth: [birth year]  
                         Report #:      [id number]
        [id number]
_______________________________________________________________________________
limited.  If clinically relevant test suggest Limited echo with 
agitated saline contrast.
 
 
 
 
 
 
 
 
 
 
 
 
 
 
 
 
 
 
 
 
 
 
 
 
 
 
 
 
 
 
 
 
 
 
 
 
 
 
 
 
 
 
  <ELECTRONICALLY SIGNED>
   By: Mao Lu MD    
  02/03/22 1213
D: 02/03/22 1213                           _____________________________________
T: 02/03/22 1213                           Mao Lu MD      /INF

## 2022-02-04 LAB
EST. AVERAGE GLUCOSE BLD GHB EST-MCNC: 114 MG/DL
GLYCOHEMOGLOBIN (HGB A1C): 5.6 % (ref 4.8–5.6)